# Patient Record
Sex: MALE | Race: WHITE
[De-identification: names, ages, dates, MRNs, and addresses within clinical notes are randomized per-mention and may not be internally consistent; named-entity substitution may affect disease eponyms.]

---

## 2018-04-27 ENCOUNTER — HOSPITAL ENCOUNTER (OUTPATIENT)
Dept: HOSPITAL 62 - END | Age: 23
End: 2018-04-27
Attending: INTERNAL MEDICINE
Payer: COMMERCIAL

## 2018-04-27 DIAGNOSIS — Z53.8: ICD-10-CM

## 2018-04-27 DIAGNOSIS — Z43.1: Primary | ICD-10-CM

## 2018-04-27 NOTE — PROGRESS NOTE
Provider Note


Provider Note: 





patient does not require PEG tube any further


patient is eating


has not use PEG for several weeks


no dysphagia, has been gaining weight


patient is placed in the supine positive


5cc of fluid removed from the Fabiola 20F tube


tube removed in 1 piece


stoma area is cleaned and taped with gauze


he is discharged in good condition


discharge date: 4/27/18


discharge diet: regular


discharge activity: regular


follow up PRN